# Patient Record
Sex: FEMALE | Race: BLACK OR AFRICAN AMERICAN
[De-identification: names, ages, dates, MRNs, and addresses within clinical notes are randomized per-mention and may not be internally consistent; named-entity substitution may affect disease eponyms.]

---

## 2019-06-05 ENCOUNTER — HOSPITAL ENCOUNTER (EMERGENCY)
Dept: HOSPITAL 75 - ER | Age: 19
Discharge: HOME | End: 2019-06-05
Payer: SELF-PAY

## 2019-06-05 VITALS — DIASTOLIC BLOOD PRESSURE: 70 MMHG | SYSTOLIC BLOOD PRESSURE: 93 MMHG

## 2019-06-05 VITALS — WEIGHT: 160 LBS | BODY MASS INDEX: 22.9 KG/M2 | HEIGHT: 70 IN

## 2019-06-05 DIAGNOSIS — F43.10: ICD-10-CM

## 2019-06-05 DIAGNOSIS — F12.10: ICD-10-CM

## 2019-06-05 DIAGNOSIS — F41.9: ICD-10-CM

## 2019-06-05 DIAGNOSIS — N94.6: Primary | ICD-10-CM

## 2019-06-05 DIAGNOSIS — F32.9: ICD-10-CM

## 2019-06-05 DIAGNOSIS — F17.210: ICD-10-CM

## 2019-06-05 LAB
APTT PPP: YELLOW S
BACTERIA #/AREA URNS HPF: (no result) /HPF
BILIRUB UR QL STRIP: NEGATIVE
FIBRINOGEN PPP-MCNC: (no result) MG/DL
GLUCOSE UR STRIP-MCNC: NEGATIVE MG/DL
KETONES UR QL STRIP: (no result)
LEUKOCYTE ESTERASE UR QL STRIP: (no result)
NITRITE UR QL STRIP: NEGATIVE
PH UR STRIP: 6 [PH] (ref 5–9)
PROT UR QL STRIP: NEGATIVE
RBC #/AREA URNS HPF: (no result) /HPF
SP GR UR STRIP: 1.01 (ref 1.02–1.02)
SQUAMOUS #/AREA URNS HPF: (no result) /HPF
UROBILINOGEN UR-MCNC: NORMAL MG/DL
WBC #/AREA URNS HPF: (no result) /HPF

## 2019-06-05 PROCEDURE — 81000 URINALYSIS NONAUTO W/SCOPE: CPT

## 2019-06-05 PROCEDURE — 96372 THER/PROPH/DIAG INJ SC/IM: CPT

## 2019-06-05 PROCEDURE — 99284 EMERGENCY DEPT VISIT MOD MDM: CPT

## 2019-06-05 NOTE — ED GU-FEMALE
General


Chief Complaint:  Abdominal/GI Problems


Stated Complaint:  ABD CRAMPING


Nursing Triage Note:  


Pt amb to room #10 w/o difficulty. a&ox4. c/o lower abd pain, nausea, vomiting, 


and diarrhea. Reports symtpoms began yesterday after her menstraul cycle began. 


Pt states, "I have horrible cramps during my period, and they just keep getting 


worse and worse." Pt reports to have taken x10 500mg tylenol throughout this day




with no relief. Pt noted to be tearful and garding stomach.


Nursing Sepsis Screen:  No Definite Risk


Source:  patient


Exam Limitations:  no limitations





History of Present Illness


Date Seen by Provider:  Jun 5, 2019


Time Seen by Provider:  18:08


Initial Comments


18-year-old female who presents to the emergency room with complaints of lower 

abdominal cramping, nausea, she reports she's had increasing menstrual cycle 

pain for the past 6 months. She denies seeing her primary care provider or 

OB/GYN for these issues. She reports taking Tylenol with minimal relief. Denies 

fevers.


Timing/Duration:  yesterday


Associated Symptoms:  abdominal pain





Allergies and Home Medications


Allergies


Coded Allergies:  


     No Known Drug Allergies (Unverified , 6/5/19)





Home Medications


Ondansetron 4 Mg Tab.rapdis, 4 MG PO Q4H PRN for NAUSEA/VOMITING-1ST LINE


   Prescribed by: LISET VAZQUEZ on 6/5/19 2029


Tramadol HCl 50 Mg Tablet, 50 MG PO Q6H PRN for PAIN


   Prescribed by: LISET VAZQUEZ on 6/5/19 2005





Patient Home Medication List


Home Medication List Reviewed:  Yes





Review of Systems


Review of Systems


Constitutional:  see HPI; No chills, No fever


Gastrointestinal:  see HPI, nausea


Genitourinary:  see HPI, other (suprapubic pain)





All Other Systemes Reviewed


Negative Unless Noted:  Yes





Past Medical-Social-Family Hx


Past Med/Social Hx:  Reviewed Nursing Past Med/Soc Hx


Patient Social History


Alcohol Use:  Denies Use


Recreational Drug Use:  Yes


Drug of Choice:  THC


Smoking Status:  Current Everyday Smoker


Type Used:  Cigarettes


2nd Hand Smoke Exposure:  No


Recent Foreign Travel:  No


Contact w/Someone Who Travel:  No


Recent Infectious Disease Expo:  No


Recent Hopitalizations:  No





Seasonal Allergies


Seasonal Allergies:  No





Past Medical History


Surgeries:  No


Cardiac:  No


Neurological:  No


Genitourinary:  No


Gastrointestinal:  No


Musculoskeletal:  No


Endocrine:  No


HEENT:  No


Cancer:  No


Psychosocial:  Yes


Anxiety, PTSD, Depression





Family Medical History


Reviewed Nursing Family Hx





Physical Exam


Vital Signs





Vital Signs - First Documented








 6/5/19





 18:00


 


Temp 98.2


 


Pulse 73


 


Resp 17


 


B/P (MAP) 109/70 (83)


 


Pulse Ox 100


 


O2 Delivery Room Air





Capillary Refill : Less Than 3 Seconds


Height, Weight, BMI


Height: 5'10.00"


Weight: 160lbs. oz. [id number];  BMI


Method:Stated


General Appearance:  WD/WN, no apparent distress


Cardiovascular:  normal peripheral pulses, regular rate, rhythm, no edema, no 

gallop, no JVD, no murmur


Respiratory:  chest non-tender, lungs clear, normal breath sounds, no 

respiratory distress, no accessory muscle use, respiratory distress


Gastrointestinal:  normal bowel sounds, non tender, soft, no organomegaly, no 

pulsatile mass, abnormal bowel sounds


Extremities:  normal capillary refill


Neurologic/Psychiatric:  alert, normal mood/affect, oriented x 3


Skin:  normal color, warm/dry





Progress/Results/Core Measures


Suspected Sepsis


Recent Fever Within 48 Hours:  No


Infection Criteria Present:  None


New/Unexplained  Altered Menta:  No


Sepsis Screen:  No Definite Risk


SIRS


Temperature:98.2 


Pulse: 73 


Respiratory Rate: 17


 


Blood Pressure 109 /70 


Mean: 83





Results/Orders


Lab Results





My Orders





Medications Given in ED





Vital Signs/I&O


Capillary Refill : Less Than 3 Seconds








Blood Pressure Mean:                    83











Departure


Impression





   Primary Impression:  


   Menstrual cramps


Disposition:  01 HOME, SELF-CARE


Condition:  Stable/Unchanged





Departure-Patient Inst.


Decision time for Depature:  20:02


Referrals:  


NO,LOCAL PHYSICIAN (PCP/Family)


Primary Care Physician


Patient Instructions:  LOCAL PHYSICIAN LIST, Painful Periods





Add. Discharge Instructions:  


Take medications as directed. Follow-up with an OB/GYN or primary care provider 

for further evaluation of your painful periods. Continue to use ibuprofen and 

Tylenol as directed by the bottle for pain relief. All discharge instructions 

reviewed with patient and/or family. Voiced understanding.


Scripts


Ondansetron (Ondansetron Odt) 4 Mg Tab.rapdis


4 MG PO Q4H PRN for NAUSEA/VOMITING-1ST LINE, #14 TAB


   Prov: LISET VAZQUEZ         6/5/19 


Tramadol HCl (Tramadol HCl) 50 Mg Tablet


50 MG PO Q6H PRN for PAIN for 3 Days, #14 TAB 0 Refills


   Prov: LISET VAZQUEZ         6/5/19











LISET VAZQUEZ                   Jun 5, 2019 20:05